# Patient Record
Sex: FEMALE | Race: WHITE | NOT HISPANIC OR LATINO | URBAN - NONMETROPOLITAN AREA
[De-identification: names, ages, dates, MRNs, and addresses within clinical notes are randomized per-mention and may not be internally consistent; named-entity substitution may affect disease eponyms.]

---

## 2019-05-22 ENCOUNTER — IMPORTED ENCOUNTER (OUTPATIENT)
Dept: URBAN - NONMETROPOLITAN AREA CLINIC 1 | Facility: CLINIC | Age: 74
End: 2019-05-22

## 2019-05-22 PROBLEM — Z96.1: Noted: 2019-05-22

## 2019-05-22 PROBLEM — H52.4: Noted: 2019-05-22

## 2019-05-22 PROCEDURE — 92014 COMPRE OPH EXAM EST PT 1/>: CPT

## 2019-05-22 PROCEDURE — 92015 DETERMINE REFRACTIVE STATE: CPT

## 2019-05-22 NOTE — PATIENT DISCUSSION
P/C IOL OUDiscussed diagnosis in detail with patient. Both intraocular implants in place and stable. Continue to monitor. Presbyopia OUDiscussed refractive status in detail with patient. New glasses Rx given today. Continue to monitor.

## 2019-10-09 ENCOUNTER — IMPORTED ENCOUNTER (OUTPATIENT)
Dept: URBAN - NONMETROPOLITAN AREA CLINIC 1 | Facility: CLINIC | Age: 74
End: 2019-10-09

## 2019-10-09 NOTE — PATIENT DISCUSSION
P/C IOL OUDiscussed diagnosis in detail with patient. Both intraocular implants in place and stable. Continue to monitor. Presbyopia OUDiscussed refractive status in detail with patient. New glasses Rx given today. (remake lenses into lined bifocal)Continue to monitor.

## 2019-11-13 ENCOUNTER — IMPORTED ENCOUNTER (OUTPATIENT)
Dept: URBAN - NONMETROPOLITAN AREA CLINIC 1 | Facility: CLINIC | Age: 74
End: 2019-11-13

## 2019-11-13 NOTE — PATIENT DISCUSSION
P/C IOL OUDiscussed diagnosis in detail with patient. Both intraocular implants in place and stable. Continue to monitor. Presbyopia OUDiscussed refractive status in detail with patient. No change in Rx noted. (suggest patient have prescription reading glasses made)Continue to monitor.

## 2021-04-14 NOTE — PATIENT DISCUSSION
CATARACTS, OU: VISUALLY SIGNIFICANT. OPTION OF SURGERY VERSUS FOLLOWING VERSUS UPDATING GLASSES DISCUSSED. RBA'S DISCUSSED, PATIENT UNDERSTANDS AND DESIRES SURGERY TO INCREASE VISION FOR DRIVING, READING AND GLARE FROM LIGHTS. SCHEDULE CATARACT SURGERY/PRE-OP OU.

## 2022-04-09 ASSESSMENT — VISUAL ACUITY
OS_SC: 20/25-
OU_CC: 20/40
OS_CC: 20/30
OD_CC: 20/60
OS_SC: 20/25
OD_SC: 20/20
OD_SC: 20/20-

## 2022-04-09 ASSESSMENT — TONOMETRY
OS_IOP_MMHG: 14
OD_IOP_MMHG: 14

## 2022-09-02 ENCOUNTER — ESTABLISHED PATIENT (OUTPATIENT)
Dept: RURAL CLINIC 3 | Facility: CLINIC | Age: 77
End: 2022-09-02

## 2022-09-02 DIAGNOSIS — H52.4: ICD-10-CM

## 2022-09-02 PROCEDURE — 92015 DETERMINE REFRACTIVE STATE: CPT

## 2022-09-02 PROCEDURE — 92014 COMPRE OPH EXAM EST PT 1/>: CPT

## 2022-09-02 ASSESSMENT — TONOMETRY
OD_IOP_MMHG: 14
OS_IOP_MMHG: 14

## 2022-09-02 ASSESSMENT — VISUAL ACUITY
OD_CC: 20/20-1
OS_CC: 20/20-1

## 2023-10-05 ENCOUNTER — FOLLOW UP (OUTPATIENT)
Dept: RURAL CLINIC 3 | Facility: CLINIC | Age: 78
End: 2023-10-05

## 2023-10-05 DIAGNOSIS — H35.373: ICD-10-CM

## 2023-10-05 DIAGNOSIS — H52.4: ICD-10-CM

## 2023-10-05 PROCEDURE — 92134 CPTRZ OPH DX IMG PST SGM RTA: CPT

## 2023-10-05 PROCEDURE — 99214 OFFICE O/P EST MOD 30 MIN: CPT

## 2023-10-05 PROCEDURE — 92015 DETERMINE REFRACTIVE STATE: CPT

## 2023-10-05 ASSESSMENT — TONOMETRY
OS_IOP_MMHG: 16
OD_IOP_MMHG: 16

## 2023-10-05 ASSESSMENT — VISUAL ACUITY
OS_CC: 20/20-1
OD_CC: 20/20